# Patient Record
Sex: FEMALE | Race: BLACK OR AFRICAN AMERICAN | NOT HISPANIC OR LATINO | URBAN - NONMETROPOLITAN AREA
[De-identification: names, ages, dates, MRNs, and addresses within clinical notes are randomized per-mention and may not be internally consistent; named-entity substitution may affect disease eponyms.]

---

## 2021-05-13 NOTE — PATIENT DISCUSSION
5/13/21: NEW DX, BUT CLINICALLY SEEMS TO BE CHRONIC. NO EDEMA SEEN, ONLY PERIPHERAL NON PERFUSION BUT NO EVIDENCE OF NV. THEREFORE NO TREATMENT NEEDED TODAY based on today's exam, diagnostic studies, and/or review of records. Observation recommended. Advised patient if edema increases or vision decreases will reconsider treatment.

## 2021-05-13 NOTE — PATIENT DISCUSSION
We reviewed the SIGNS AND SYMPTOMS OF RETINAL TEAR/RETINAL DETACHMENT and the importance of prompt evaluation should there be increasing floaters, new flashing lights, or decreasing central or peripheral vision in either eye. Discussed need for surgery if Tractional Retinal Detachment affects central macula.

## 2021-05-13 NOTE — PATIENT DISCUSSION
"Personally reviewed patients records from referring Physician.  DISCUSSED W PT DIAGNOSIS, IMAGES AND NEED OF F/U & CARERISK OF VL"" LOW/MOD."

## 2021-06-03 NOTE — PATIENT DISCUSSION
6/3/21: No NEW retinal tears or retinal detachment seen on clinical exam today. Reviewed the signs and symptoms of retinal tear/retinal detachment and the importance of calling for prompt evaluation should there be increasing floaters, new flashing lights, or decreasing peripheral vision in either eye at any time. Observation recommended.

## 2021-06-29 ENCOUNTER — IMPORTED ENCOUNTER (OUTPATIENT)
Dept: URBAN - NONMETROPOLITAN AREA CLINIC 1 | Facility: CLINIC | Age: 76
End: 2021-06-29

## 2021-06-29 PROBLEM — H35.3131: Noted: 2021-06-29

## 2021-06-29 PROBLEM — H25.813: Noted: 2021-06-29

## 2021-06-29 PROCEDURE — 92004 COMPRE OPH EXAM NEW PT 1/>: CPT

## 2021-06-29 NOTE — PATIENT DISCUSSION
Cataract(s)-Visually significant cataract OU .-Cataract(s) causing symptomatic impairment of visual function not correctable with a tolerable change in glasses or contact lenses lighting or non-operative means resulting in specific activity limitations and/or participation restrictions including but not limited to reading viewing television driving or meeting vocational or recreational needs. -Expectation is clearer vision and functional improvement in symptoms as well as reduced glare disability after cataract removal.-Order IOLMaster and OPD today. -Recommend Stand/Trad  based on today's OPD testing and lifestyle questionnaire.-All questions were answered regarding surgery including pre and post-op medications appointments activity restrictions and anesthetic usage.-The risks benefits and alternatives and special risk factors for the patient were discussed in detail including but not limited to: bleeding infection retinal detachment vitreous loss problems with the implant and possible need for additional surgery.-Although rare the possibility of complete vision loss was discussed.-The possible need for glasses post-operatively was discussed.-Order medical clearance exam based on history of diabetes-Patient elects to proceed with cataract surgery OD . Will schedule at patient's convenience and re-evaluate OS  in the future. Discussed lens and patient elects Stand/Trad OU and discussed need for bifocals. Post op inflammation anticipated discussed dextenza insertion after surgery. 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 Stable w/ little progression but guarded prognosis discussed.

## 2021-07-28 ENCOUNTER — IMPORTED ENCOUNTER (OUTPATIENT)
Dept: URBAN - NONMETROPOLITAN AREA CLINIC 1 | Facility: CLINIC | Age: 76
End: 2021-07-28

## 2021-07-28 PROBLEM — E11.9: Noted: 2021-07-28

## 2021-07-28 PROBLEM — Z01.818: Noted: 2021-07-28

## 2021-07-28 PROBLEM — Z85.3: Noted: 2021-07-28

## 2021-07-28 PROBLEM — E78.5: Noted: 2021-07-28

## 2021-07-28 PROBLEM — H25.813: Noted: 2021-07-28

## 2021-07-28 PROBLEM — I10: Noted: 2021-07-28

## 2021-07-28 PROBLEM — F41.1: Noted: 2021-07-28

## 2021-07-28 PROBLEM — H35.3131: Noted: 2021-07-28

## 2021-07-28 PROBLEM — F32.9: Noted: 2021-07-28

## 2021-07-28 PROCEDURE — 99214 OFFICE O/P EST MOD 30 MIN: CPT

## 2021-08-19 ENCOUNTER — IMPORTED ENCOUNTER (OUTPATIENT)
Dept: URBAN - NONMETROPOLITAN AREA CLINIC 1 | Facility: CLINIC | Age: 76
End: 2021-08-19

## 2021-08-19 PROBLEM — Z98.41: Noted: 2021-08-19

## 2021-08-19 PROBLEM — H25.812: Noted: 2021-08-19

## 2021-08-19 PROCEDURE — 92136 OPHTHALMIC BIOMETRY: CPT

## 2021-08-19 PROCEDURE — 0356T INSERTION OF DRUG-ELUTING IMPLANT (INCLUDING PUNCTAL DILATION AND IMPLANT REMOVAL WHEN PERFORMED) INTO LACRIMAL CANALICULUS, EACH: CPT

## 2021-08-19 PROCEDURE — 99024 POSTOP FOLLOW-UP VISIT: CPT

## 2021-08-19 PROCEDURE — 66984 XCAPSL CTRC RMVL W/O ECP: CPT

## 2021-08-19 NOTE — PATIENT DISCUSSION
Cataract(s)-Visually significant cataract OS. -Cataract(s) causing symptomatic impairment of visual function not correctable with a tolerable change in glasses or contact lenses lighting or non-operative means resulting in specific activity limitations and/or participation restrictions including but not limited to reading viewing television driving or meeting vocational or recreational needs. -Expectation is clearer vision and functional improvement in symptoms as well as reduced glare disability after cataract removal.-Order IOLMaster and OPD today. -Recommend Standard/Trad based on today's OPD testing and lifestyle questionnaire.-All questions were answered regarding surgery including pre and post-op medications appointments activity restrictions and anesthetic usage.-The risks benefits and alternatives and special risk factors for the patient were discussed in detail including but not limited to: bleeding infection retinal detachment vitreous loss problems with the implant and possible need for additional surgery.-Although rare the possibility of complete vision loss was discussed.-The possible need for glasses post-operatively was discussed.-Order medical clearance exam based on history of diabetes and hypertension.-Patient elects to proceed with cataract surgery OS. Same Day POV CE IOL OD- patient doing well and stable. - Continue post op drops as directed. - Recommend night shield X 7 nights.- Continue to monitor.

## 2021-09-02 ENCOUNTER — IMPORTED ENCOUNTER (OUTPATIENT)
Dept: URBAN - NONMETROPOLITAN AREA CLINIC 1 | Facility: CLINIC | Age: 76
End: 2021-09-02

## 2021-09-02 PROBLEM — Z98.42: Noted: 2021-09-02

## 2021-09-02 PROBLEM — Z98.41: Noted: 2021-09-02

## 2021-09-02 PROCEDURE — 99024 POSTOP FOLLOW-UP VISIT: CPT

## 2021-09-02 PROCEDURE — 92136 OPHTHALMIC BIOMETRY: CPT

## 2021-09-02 PROCEDURE — 66984 XCAPSL CTRC RMVL W/O ECP: CPT

## 2021-09-02 PROCEDURE — 0356T INSERTION OF DRUG-ELUTING IMPLANT (INCLUDING PUNCTAL DILATION AND IMPLANT REMOVAL WHEN PERFORMED) INTO LACRIMAL CANALICULUS, EACH: CPT

## 2021-09-02 NOTE — PATIENT DISCUSSION
Same Day POV CE IOL OS (9/2/21)  2 wk POV CE IOL OD (8/19/21) : stand w/dex-Pt doing well s/p PCIOL. -Continue post-op gtts according to instruction sheet and sleep with eye shield over eye for 7 nights.-Avoid bending at the waist lifting anything over 5lbs and dirty or benitez environments. -RTC A/S in one week with Dr. Sommer George

## 2022-04-09 ASSESSMENT — VISUAL ACUITY
OS_AM: 20/20-
OD_PAM: 20/25+
OS_CC: 20/60-
OS_AM: 20/20-
OS_CC: 20/60
OS_GLARE: 20/400+
OS_SC: 20/40-
OD_CC: 20/50
OS_CC: 20/50
OD_SC: 20/40-
OS_GLARE: 20/400+
OS_AM: 20/20-
OS_PH: 20/50
OS_SC: 20/40-
OS_GLARE: 20/400+
OD_GLARE: 20/400
OD_CC: 20/40-1
OS_GLARE: 20/400+
OD_GLARE: 20/400

## 2022-04-09 ASSESSMENT — TONOMETRY
OD_IOP_MMHG: 14
OS_IOP_MMHG: 15
OD_IOP_MMHG: 16
OS_IOP_MMHG: 11
OD_IOP_MMHG: 11
OS_IOP_MMHG: 15